# Patient Record
Sex: FEMALE | Race: WHITE | NOT HISPANIC OR LATINO | Employment: STUDENT | URBAN - METROPOLITAN AREA
[De-identification: names, ages, dates, MRNs, and addresses within clinical notes are randomized per-mention and may not be internally consistent; named-entity substitution may affect disease eponyms.]

---
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
## 2022-05-31 ENCOUNTER — OFFICE VISIT (OUTPATIENT)
Dept: OBSTETRICS AND GYNECOLOGY | Facility: CLINIC | Age: 22
End: 2022-05-31

## 2022-05-31 VITALS — SYSTOLIC BLOOD PRESSURE: 106 MMHG | BODY MASS INDEX: 34.9 KG/M2 | WEIGHT: 216.2 LBS | DIASTOLIC BLOOD PRESSURE: 64 MMHG

## 2022-05-31 DIAGNOSIS — Z01.419 ENCOUNTER FOR ANNUAL ROUTINE GYNECOLOGICAL EXAMINATION: Primary | ICD-10-CM

## 2022-05-31 DIAGNOSIS — Z30.41 ENCOUNTER FOR SURVEILLANCE OF CONTRACEPTIVE PILLS: ICD-10-CM

## 2022-05-31 DIAGNOSIS — Z11.3 SCREENING FOR STDS (SEXUALLY TRANSMITTED DISEASES): ICD-10-CM

## 2022-05-31 PROCEDURE — 99395 PREV VISIT EST AGE 18-39: CPT | Performed by: NURSE PRACTITIONER

## 2022-05-31 RX ORDER — NORETHINDRONE ACETATE AND ETHINYL ESTRADIOL, ETHINYL ESTRADIOL AND FERROUS FUMARATE 1MG-10(24)
1 KIT ORAL DAILY
Qty: 84 TABLET | Refills: 3 | Status: SHIPPED | OUTPATIENT
Start: 2022-05-31

## 2022-05-31 RX ORDER — AMITRIPTYLINE HYDROCHLORIDE 10 MG/1
TABLET, FILM COATED ORAL
COMMUNITY
Start: 2022-05-25

## 2022-05-31 NOTE — PROGRESS NOTES
GYN Annual Exam     CC- Here for annual exam.   Subjective   HPI  Winnie Salvaodr is a 21 y.o. female established patient who presents for annual well woman exam. Her periods are typically every three months, secondary to OCP, lasting 7 days and are moderate and clots are present.  She reports mild dysmenorrhea.  Partner Status: Marital Status: single.  New Partners since last visit: yes  Condom usage with IC: frequently.      The patient does not have complaints today.    She exercises regularly: yes.  She has concerns about domestic violence: no.    OB History        0    Para   0    Term   0       0    AB   0    Living   0       SAB   0    IAB   0    Ectopic   0    Molar   0    Multiple   0    Live Births   0              Current contraception: OCP (estrogen/progesterone)  History of abnormal Pap smear: no  Family history of uterine, colon or ovarian cancer: no  Family history of breast cancer: no  H/o STDs: No  Last pap:N/A  Gardasil:completed  Thromboembolic Disease: none    Health Maintenance   Topic Date Due   • ANNUAL PHYSICAL  Never done   • HPV VACCINES (1 - 2-dose series) Never done   • HEPATITIS C SCREENING  Never done   • COVID-19 Vaccine (3 - Booster for Pfizer series) 2021   • CHLAMYDIA SCREENING  2022   • INFLUENZA VACCINE  2022   • TDAP/TD VACCINES (2 - Td or Tdap) 2023   • MENINGOCOCCAL VACCINE  Completed   • Pneumococcal Vaccine 0-64  Aged Out       The following portions of the patient's history were reviewed and updated as appropriate: allergies, current medications, past family history, past medical history, past social history, past surgical history and problem list.    Review of Systems  Review of Systems   Constitutional: Negative.    HENT: Negative.    Eyes: Negative.    Respiratory: Negative.    Cardiovascular: Negative.    Gastrointestinal: Negative.    Endocrine: Negative.    Genitourinary: Negative.    Musculoskeletal: Negative.    Skin: Negative.     Allergic/Immunologic: Negative.    Neurological: Negative.    Hematological: Negative.    Psychiatric/Behavioral: Negative.        I have reviewed and agree with the HPI, ROS, and historical information as entered above. MONICA Horner      Past Medical History:   Diagnosis Date   • Anxiety and depression    • Immunization series complete     Gardasil   • Menorrhagia         Past Surgical History:   Procedure Laterality Date   • SHOULDER SURGERY      Labrum Repair           Objective   /64   Wt 98.1 kg (216 lb 3.2 oz)   LMP 05/26/2022 (Exact Date)   Breastfeeding No   BMI 34.90 kg/m²   Physical Exam  Vitals and nursing note reviewed. Exam conducted with a chaperone present.   Constitutional:       Appearance: Normal appearance.   HENT:      Head: Normocephalic and atraumatic.   Cardiovascular:      Rate and Rhythm: Normal rate and regular rhythm.   Pulmonary:      Effort: Pulmonary effort is normal.      Breath sounds: Normal breath sounds.   Abdominal:      General: Abdomen is flat.      Palpations: Abdomen is soft.   Genitourinary:     Labia:         Right: No rash, tenderness or lesion.         Left: No rash, tenderness or lesion.       Vagina: Normal. No lesions.      Cervix: No cervical motion tenderness, discharge, lesion or cervical bleeding.      Uterus: Normal. Not enlarged, not fixed and not tender.       Adnexa:         Right: No mass or tenderness.          Left: No mass or tenderness.        Rectum: No external hemorrhoid.      Comments: Chaperone Present  Skin:     General: Skin is warm and dry.   Neurological:      Mental Status: She is alert and oriented to person, place, and time.   Psychiatric:         Behavior: Behavior normal.            Assessment    Encounter Diagnoses   Name Primary?   • Screening for STDs (sexually transmitted diseases)    • Encounter for surveillance of contraceptive pills    • Encounter for annual routine gynecological examination Yes       Plan      1. Encouraged use of condoms for STD prevention.   Cultures today as quality measure.   2. Happy on current BERNARD - understands inherent risks including increase lifetime risk of breast cancer dx, VTE and stroke.  Understands BERNARD reduce lifetime risk of ovarian cancer.   3. Gardasil complete.   4. Reviewed exercise as a preventative health measures.   5. RTC in 1 year or PRN with problems       Edith Ramirez, APRN  05/31/2022   none

## 2022-06-01 LAB
C TRACH RRNA SPEC QL NAA+PROBE: NEGATIVE
N GONORRHOEA RRNA SPEC QL NAA+PROBE: NEGATIVE

## 2023-11-27 ENCOUNTER — OFFICE VISIT (OUTPATIENT)
Dept: FAMILY MEDICINE CLINIC | Facility: CLINIC | Age: 23
End: 2023-11-27
Payer: COMMERCIAL

## 2023-11-27 VITALS
HEIGHT: 66 IN | TEMPERATURE: 97.3 F | SYSTOLIC BLOOD PRESSURE: 104 MMHG | WEIGHT: 204.2 LBS | DIASTOLIC BLOOD PRESSURE: 64 MMHG | HEART RATE: 82 BPM | BODY MASS INDEX: 32.82 KG/M2 | OXYGEN SATURATION: 99 %

## 2023-11-27 DIAGNOSIS — J02.9 SORE THROAT: ICD-10-CM

## 2023-11-27 DIAGNOSIS — J01.00 ACUTE NON-RECURRENT MAXILLARY SINUSITIS: Primary | ICD-10-CM

## 2023-11-27 DIAGNOSIS — H10.33 ACUTE BACTERIAL CONJUNCTIVITIS OF BOTH EYES: ICD-10-CM

## 2023-11-27 DIAGNOSIS — J40 BRONCHITIS: ICD-10-CM

## 2023-11-27 DIAGNOSIS — R05.9 COUGH, UNSPECIFIED TYPE: ICD-10-CM

## 2023-11-27 PROBLEM — F41.0 PANIC DISORDER (EPISODIC PAROXYSMAL ANXIETY): Status: ACTIVE | Noted: 2022-02-15

## 2023-11-27 PROBLEM — F33.41 MAJOR DEPRESSIVE DISORDER, RECURRENT, IN PARTIAL REMISSION: Status: ACTIVE | Noted: 2020-04-23

## 2023-11-27 PROBLEM — F90.2 ATTENTION DEFICIT HYPERACTIVITY DISORDER, COMBINED TYPE: Status: ACTIVE | Noted: 2022-11-03

## 2023-11-27 LAB
EXPIRATION DATE: NORMAL
EXPIRATION DATE: NORMAL
INTERNAL CONTROL: NORMAL
INTERNAL CONTROL: NORMAL
Lab: NORMAL
Lab: NORMAL
S PYO AG THROAT QL: NEGATIVE
SARS-COV-2 AG UPPER RESP QL IA.RAPID: NOT DETECTED

## 2023-11-27 PROCEDURE — 87426 SARSCOV CORONAVIRUS AG IA: CPT | Performed by: NURSE PRACTITIONER

## 2023-11-27 PROCEDURE — 87880 STREP A ASSAY W/OPTIC: CPT | Performed by: NURSE PRACTITIONER

## 2023-11-27 PROCEDURE — 99213 OFFICE O/P EST LOW 20 MIN: CPT | Performed by: NURSE PRACTITIONER

## 2023-11-27 RX ORDER — AMOXICILLIN AND CLAVULANATE POTASSIUM 875; 125 MG/1; MG/1
1 TABLET, FILM COATED ORAL 2 TIMES DAILY
Qty: 20 TABLET | Refills: 0 | Status: SHIPPED | OUTPATIENT
Start: 2023-11-27 | End: 2023-12-07

## 2023-11-27 RX ORDER — METHYLPREDNISOLONE 4 MG/1
TABLET ORAL
Qty: 21 EACH | Refills: 0 | Status: SHIPPED | OUTPATIENT
Start: 2023-11-27

## 2023-11-27 RX ORDER — TOBRAMYCIN 3 MG/ML
2 SOLUTION/ DROPS OPHTHALMIC
Qty: 5 ML | Refills: 0 | Status: SHIPPED | OUTPATIENT
Start: 2023-11-27 | End: 2023-12-04

## 2023-11-27 RX ORDER — DEXTROMETHORPHAN HYDROBROMIDE AND PROMETHAZINE HYDROCHLORIDE 15; 6.25 MG/5ML; MG/5ML
5 SYRUP ORAL NIGHTLY PRN
Qty: 118 ML | Refills: 0 | Status: SHIPPED | OUTPATIENT
Start: 2023-11-27

## 2023-11-27 RX ORDER — BENZONATATE 100 MG/1
100 CAPSULE ORAL 3 TIMES DAILY PRN
Qty: 21 CAPSULE | Refills: 0 | Status: SHIPPED | OUTPATIENT
Start: 2023-11-27

## 2023-11-27 RX ORDER — EPINEPHRINE 0.3 MG/.3ML
0.3 INJECTION SUBCUTANEOUS AS NEEDED
COMMUNITY

## 2023-11-27 NOTE — PROGRESS NOTES
Follow Up Office Note     Patient Name: Winnie Salvador  : 2000   MRN: [id number]     Chief Complaint:    Chief Complaint   Patient presents with    Cough     Per patient she has had a cough and congestion for about 2 weeks.    Eye Problem     Per patient her left eye is red, itchy and matted together when she wakes up x 2 days    Sore Throat       History of Present Illness: Winnie Salvador is a 23 y.o. female who presents today with c/o sinus congestion, postnasal drainage, sore throat, cough x approximately 2 weeks.     Patient c/o left eye red, irritated, draining x 2-3 days. Right eye red today.      Subjective      I have reviewed and the following portions of the patient's history were updated as appropriate: past family history, past medical history, past social history, past surgical history and problem list.    Review of Systems:   Review of Systems   Constitutional:  Positive for fatigue. Negative for appetite change, chills, diaphoresis and fever.   HENT:  Positive for congestion, postnasal drip, sinus pressure, sinus pain and sore throat. Negative for ear discharge, ear pain and trouble swallowing.    Eyes:  Positive for discharge and redness. Negative for visual disturbance.   Respiratory:  Positive for cough and chest tightness. Negative for shortness of breath, wheezing and stridor.    Cardiovascular: Negative.    Gastrointestinal: Negative.    Musculoskeletal:  Negative for myalgias.   Neurological:  Negative for dizziness.        Past Medical History:   Past Medical History:   Diagnosis Date    Anxiety and depression     Immunization series complete     Gardasil    Menorrhagia      Patient's last menstrual period was 2023 (exact date).     Medications:     Current Outpatient Medications:     EPINEPHrine (EpiPen 2-Jaun) 0.3 MG/0.3ML solution auto-injector injection, Inject 0.3 mL into the appropriate muscle as directed by prescriber As Needed., Disp: , Rfl:     amoxicillin-clavulanate  "(AUGMENTIN) 875-125 MG per tablet, Take 1 tablet by mouth 2 (Two) Times a Day for 10 days., Disp: 20 tablet, Rfl: 0    benzonatate (Tessalon Perles) 100 MG capsule, Take 1 capsule by mouth 3 (Three) Times a Day As Needed for Cough., Disp: 21 capsule, Rfl: 0    methylPREDNISolone (MEDROL) 4 MG dose pack, Take as directed on package instructions., Disp: 21 each, Rfl: 0    promethazine-dextromethorphan (PROMETHAZINE-DM) 6.25-15 MG/5ML syrup, Take 5 mL by mouth At Night As Needed for Cough., Disp: 118 mL, Rfl: 0    tobramycin (Tobrex) 0.3 % solution ophthalmic solution, Administer 2 drops to both eyes Every 4 (Four) Hours While Awake for 7 days., Disp: 5 mL, Rfl: 0    Allergies:   Allergies   Allergen Reactions    Pineapple Anaphylaxis, Hives, Itching, Shortness Of Breath and Swelling    Milk-Related Compounds Rash         Objective     Physical Exam:  Vital Signs:   Vitals:    11/27/23 0858   BP: 104/64   Pulse: 82   Temp: 97.3 °F (36.3 °C)   TempSrc: Infrared   SpO2: 99%   Weight: 92.6 kg (204 lb 3.2 oz)   Height: 167.6 cm (65.98\")   PainSc: 0-No pain     Body mass index is 32.97 kg/m².     Physical Exam  Vitals and nursing note reviewed.   Constitutional:       General: She is not in acute distress.     Appearance: Normal appearance. She is well-developed. She is not ill-appearing, toxic-appearing or diaphoretic.   HENT:      Head: Normocephalic and atraumatic.      Right Ear: External ear normal. A middle ear effusion is present. Tympanic membrane is injected and bulging.      Left Ear: External ear normal. A middle ear effusion is present. Tympanic membrane is injected and bulging.      Nose: Mucosal edema and congestion present.      Right Turbinates: Swollen.      Left Turbinates: Swollen.      Right Sinus: Maxillary sinus tenderness present.      Left Sinus: Maxillary sinus tenderness present.      Mouth/Throat:      Lips: Pink.      Mouth: Mucous membranes are moist.      Pharynx: Uvula midline. Posterior " oropharyngeal erythema (with cobblestoning) present.   Eyes:      General: Lids are normal.         Right eye: No hordeolum.         Left eye: Discharge present.No hordeolum.      Conjunctiva/sclera:      Right eye: Right conjunctiva is injected.      Left eye: Left conjunctiva is injected.   Cardiovascular:      Rate and Rhythm: Normal rate and regular rhythm.      Heart sounds: Normal heart sounds.   Pulmonary:      Effort: Pulmonary effort is normal. No tachypnea or respiratory distress.      Breath sounds: No stridor. Wheezing (occasional) present. No decreased breath sounds, rhonchi or rales.   Musculoskeletal:      Cervical back: Normal range of motion and neck supple. No rigidity. No muscular tenderness.   Lymphadenopathy:      Cervical: Cervical adenopathy present.   Skin:     General: Skin is warm and dry.   Neurological:      Mental Status: She is alert and oriented to person, place, and time.   Psychiatric:         Mood and Affect: Mood normal.         Behavior: Behavior normal. Behavior is cooperative.         Thought Content: Thought content normal.         Judgment: Judgment normal.         Assessment / Plan      Assessment/Plan:   Diagnoses and all orders for this visit:    1. Acute non-recurrent maxillary sinusitis (Primary)  -     amoxicillin-clavulanate (AUGMENTIN) 875-125 MG per tablet; Take 1 tablet by mouth 2 (Two) Times a Day for 10 days.  Dispense: 20 tablet; Refill: 0    2. Bronchitis  -     methylPREDNISolone (MEDROL) 4 MG dose pack; Take as directed on package instructions.  Dispense: 21 each; Refill: 0    3. Acute bacterial conjunctivitis of both eyes  -     tobramycin (Tobrex) 0.3 % solution ophthalmic solution; Administer 2 drops to both eyes Every 4 (Four) Hours While Awake for 7 days.  Dispense: 5 mL; Refill: 0    4. Sore throat  -     POC Rapid Strep A  Lab Results   Component Value Date    RAPSCRN Negative 11/27/2023      5. Cough, unspecified type  -     POCT SARS-CoV-2 Antigen  -      promethazine-dextromethorphan (PROMETHAZINE-DM) 6.25-15 MG/5ML syrup; Take 5 mL by mouth At Night As Needed for Cough.  Dispense: 118 mL; Refill: 0  -     benzonatate (Tessalon Perles) 100 MG capsule; Take 1 capsule by mouth 3 (Three) Times a Day As Needed for Cough.  Dispense: 21 capsule; Refill: 0     POCT SARS-CoV-2 Antigen (11/27/2023 09:32)       Follow Up:   PRN and at next scheduled appointment(s) with PCP.    Discussed the nature of the medical condition(s) risks, complications, implications, management, safe and proper use of medications. Encouraged medication compliance, and keeping scheduled follow up appointments with me and any other providers.      RTC if symptoms fail to improve, to ER if symptoms worsen.        *Dictated Utilizing Dragon Dictation   Please note that portions of this note were completed with a voice recognition program.   Part of this note may be an electronic transcription/translation of spoken language to printed text using the Dragon Dictation System. Spelling and/or grammatical errors may exist despite efforts at proofreading.      NOTE TO PATIENT: The 21st Century Cures Act makes medical notes like these available to patients in the interest of transparency. However, be advised this is a medical document. It is intended as peer to peer communication. It is written in medical language and may contain abbreviations or verbiage that are unfamiliar. It may appear blunt or direct. Medical documents are intended to carry relevant information, facts as evident, and the clinical opinion of the practitioner.      MONICA Aaron  [facility] [place] [facility] [place]

## 2023-11-27 NOTE — LETTER
November 27, 2023     Patient: Winnie Salvador   YOB: 2000   Date of Visit: 11/27/2023       To Whom It May Concern:    It is my medical opinion that Winnie Salvador  be excused from work 11/27/23-11/28/23 due to illness .           Sincerely,        MONICA Aaron    CC: No Recipients

## 2024-08-23 ENCOUNTER — OFFICE VISIT (OUTPATIENT)
Dept: FAMILY MEDICINE CLINIC | Facility: CLINIC | Age: 24
End: 2024-08-23
Payer: COMMERCIAL

## 2024-08-23 VITALS
BODY MASS INDEX: 35.25 KG/M2 | HEART RATE: 82 BPM | WEIGHT: 211.6 LBS | DIASTOLIC BLOOD PRESSURE: 76 MMHG | TEMPERATURE: 98.6 F | OXYGEN SATURATION: 98 % | HEIGHT: 65 IN | SYSTOLIC BLOOD PRESSURE: 116 MMHG

## 2024-08-23 DIAGNOSIS — J02.9 SORE THROAT: Primary | ICD-10-CM

## 2024-08-23 DIAGNOSIS — J06.9 UPPER RESPIRATORY TRACT INFECTION, UNSPECIFIED TYPE: ICD-10-CM

## 2024-08-23 LAB
EXPIRATION DATE: NORMAL
FLUAV AG NPH QL: NEGATIVE
FLUBV AG NPH QL: NEGATIVE
INTERNAL CONTROL: NORMAL
Lab: NORMAL
S PYO AG THROAT QL: NEGATIVE
SARS-COV-2 AG UPPER RESP QL IA.RAPID: NOT DETECTED

## 2024-08-23 RX ORDER — CETIRIZINE HYDROCHLORIDE 10 MG/1
10 TABLET ORAL DAILY
Qty: 30 TABLET | Refills: 5 | Status: SHIPPED | OUTPATIENT
Start: 2024-08-23

## 2024-08-23 RX ORDER — BROMPHENIRAMINE MALEATE, PSEUDOEPHEDRINE HYDROCHLORIDE, AND DEXTROMETHORPHAN HYDROBROMIDE 2; 30; 10 MG/5ML; MG/5ML; MG/5ML
5 SYRUP ORAL 4 TIMES DAILY PRN
Qty: 118 ML | Refills: 0 | Status: SHIPPED | OUTPATIENT
Start: 2024-08-23

## 2024-08-23 NOTE — PROGRESS NOTES
"Chief Complaint  Establish Care, Sore Throat, and Nasal Congestion    Subjective        Winnie [place] presents to [facility]  URI   This is a new problem. The current episode started in the past 7 days. The problem has been gradually worsening. There has been no fever. Associated symptoms include congestion, ear pain, headaches, rhinorrhea and a sore throat. Pertinent negatives include no diarrhea, dysuria or sinus pain. She has tried nothing for the symptoms. The treatment provided no relief.       Objective   Vital Signs:  /76   Pulse 82   Temp 98.6 °F (37 °C)   Ht 165.1 cm (65\")   Wt 96 kg (211 lb 9.6 oz)   SpO2 98%   BMI 35.21 kg/m²   Estimated body mass index is 35.21 kg/m² as calculated from the following:    Height as of this encounter: 165.1 cm (65\").    Weight as of this encounter: 96 kg (211 lb 9.6 oz).        Physical Exam  Vitals reviewed.   Constitutional:       Appearance: She is not ill-appearing.   HENT:      Right Ear: Tympanic membrane normal.      Left Ear: Tympanic membrane normal.      Nose: Congestion and rhinorrhea present.      Mouth/Throat:      Pharynx: Posterior oropharyngeal erythema present.   Eyes:      Pupils: Pupils are equal, round, and reactive to light.   Cardiovascular:      Rate and Rhythm: Normal rate.      Pulses: Normal pulses.   Pulmonary:      Effort: Pulmonary effort is normal.      Breath sounds: Normal breath sounds.   Neurological:      General: No focal deficit present.      Mental Status: She is alert. Mental status is at baseline.   Psychiatric:         Mood and Affect: Mood normal.         Behavior: Behavior normal.         Thought Content: Thought content normal.         Judgment: Judgment normal.        Result Review :        This SmartLink has not been configured with any valid records.      Lab Results   Component Value Date    RAPSCRN Negative 08/23/2024     Lab Results   Component Value Date    RAPFLUA Negative " 08/23/2024       SARS-CoV-2, JACLYN   Date Value Ref Range Status   11/02/2020 Not Detected Not Detected Final     Comment:     This nucleic acid amplification test was developed and its performance  characteristics determined by [email or web address]. Nucleic acid  amplification tests include PCR and TMA. This test has not been FDA  cleared or approved. This test has been authorized by FDA under an  Emergency Use Authorization (EUA). This test is only authorized for  the duration of time the declaration that circumstances exist  justifying the authorization of the emergency use of in vitro  diagnostic tests for detection of SARS-CoV-2 virus and/or diagnosis  of COVID-19 infection under section 564(b)(1) of the Act, 21 U.S.C.  360bbb-3(b) (1), unless the authorization is terminated or revoked  sooner.  When diagnostic testing is negative, the possibility of a false  negative result should be considered in the context of a patient's  recent exposures and the presence of clinical signs and symptoms  consistent with COVID-19. An individual without symptoms of COVID-19  and who is not shedding SARS-CoV-2 virus would expect to have a  negative (not detected) result in this assay.              Assessment and Plan   Diagnoses and all orders for this visit:    1. Sore throat (Primary)  -     POCT rapid strep A  -     POC Influenza A / B  -     POCT SARS-CoV-2 Antigen    2. Upper respiratory tract infection, unspecified type  Assessment & Plan:  Likely initially a viral URI with no evidence of secondary bacterial infection  Discussed use of saline nasal rinses and medications as prescribed  Continue allergy medications, Tylenol/ibuprofen as needed.  If symptoms do not improve patient to return to clinic for reevaluation      Orders:  -     brompheniramine-pseudoephedrine-DM 30-2-10 MG/5ML syrup; Take 5 mL by mouth 4 (Four) Times a Day As Needed for Cough.  Dispense: 118 mL; Refill: 0  -     cetirizine (zyrTEC) 10 MG tablet; Take  1 tablet by mouth Daily.  Dispense: 30 tablet; Refill: 5             Follow Up   No follow-ups on file.  Patient was given instructions and counseling regarding her condition or for health maintenance advice. Please see specific information pulled into the AVS if appropriate.         This document has been electronically signed by Amelia Joaquin DO   August 23, 2024 10:46 EDT    Dictated Utilizing Dragon Dictation: Part of this note may be an electronic transcription/translation of spoken language to printed text using the Dragon Dictation System.    Amelia Joaquin D.O.  [facility] [place]

## 2024-08-23 NOTE — ASSESSMENT & PLAN NOTE
Likely initially a viral URI with no evidence of secondary bacterial infection  Discussed use of saline nasal rinses and medications as prescribed  Continue allergy medications, Tylenol/ibuprofen as needed.  If symptoms do not improve patient to return to clinic for reevaluation

## 2025-05-06 ENCOUNTER — OFFICE VISIT (OUTPATIENT)
Dept: FAMILY MEDICINE CLINIC | Facility: CLINIC | Age: 25
End: 2025-05-06
Payer: COMMERCIAL

## 2025-05-06 VITALS
DIASTOLIC BLOOD PRESSURE: 78 MMHG | RESPIRATION RATE: 20 BRPM | BODY MASS INDEX: 31.96 KG/M2 | TEMPERATURE: 98.2 F | SYSTOLIC BLOOD PRESSURE: 128 MMHG | OXYGEN SATURATION: 99 % | HEIGHT: 65 IN | WEIGHT: 191.8 LBS | HEART RATE: 100 BPM

## 2025-05-06 DIAGNOSIS — R05.9 COUGH, UNSPECIFIED TYPE: ICD-10-CM

## 2025-05-06 DIAGNOSIS — J02.9 SORE THROAT: ICD-10-CM

## 2025-05-06 DIAGNOSIS — H66.001 NON-RECURRENT ACUTE SUPPURATIVE OTITIS MEDIA OF RIGHT EAR WITHOUT SPONTANEOUS RUPTURE OF TYMPANIC MEMBRANE: ICD-10-CM

## 2025-05-06 DIAGNOSIS — J10.1 INFLUENZA A: ICD-10-CM

## 2025-05-06 DIAGNOSIS — U07.1 COVID-19 VIRUS DETECTED: Primary | ICD-10-CM

## 2025-05-06 LAB
EXPIRATION DATE: ABNORMAL
EXPIRATION DATE: ABNORMAL
EXPIRATION DATE: NORMAL
FLUAV AG NPH QL: POSITIVE
FLUBV AG NPH QL: NEGATIVE
INTERNAL CONTROL: ABNORMAL
INTERNAL CONTROL: ABNORMAL
INTERNAL CONTROL: NORMAL
Lab: ABNORMAL
Lab: ABNORMAL
Lab: NORMAL
S PYO AG THROAT QL: NEGATIVE
SARS-COV-2 AG UPPER RESP QL IA.RAPID: DETECTED

## 2025-05-06 RX ORDER — BUPROPION HYDROCHLORIDE 100 MG/1
300 TABLET, EXTENDED RELEASE ORAL 2 TIMES DAILY
COMMUNITY

## 2025-05-06 RX ORDER — BROMPHENIRAMINE MALEATE, PSEUDOEPHEDRINE HYDROCHLORIDE, AND DEXTROMETHORPHAN HYDROBROMIDE 2; 30; 10 MG/5ML; MG/5ML; MG/5ML
5 SYRUP ORAL 4 TIMES DAILY PRN
Qty: 118 ML | Refills: 0 | Status: SHIPPED | OUTPATIENT
Start: 2025-05-06

## 2025-05-06 RX ORDER — GUAIFENESIN 600 MG/1
1200 TABLET, EXTENDED RELEASE ORAL 2 TIMES DAILY
Qty: 30 TABLET | Refills: 0 | Status: SHIPPED | OUTPATIENT
Start: 2025-05-06

## 2025-05-06 RX ORDER — METHYLPHENIDATE HYDROCHLORIDE 36 MG/1
36 TABLET ORAL EVERY MORNING
COMMUNITY
Start: 2025-04-29

## 2025-05-06 NOTE — LETTER
May 6, 2025     Patient: Winnie Salvador   YOB: 2000   Date of Visit: 5/6/2025       To Whom It May Concern:    It is my medical opinion that Winnie Salvador may return to work on Friday 5/9/25           Sincerely,        Amelia Joaquin DO    CC: No Recipients

## 2025-05-06 NOTE — PROGRESS NOTES
Subjective     Chief Complaint  Fever, Cough, Nasal Congestion, Fatigue, Sore Throat (Since Saturday), and Generalized Body Aches    Subjective          Winnie Salvador is a 24 y.o. female who presents today to [facility] for initial evaluation .    HPI:   Fever   Associated symptoms include coughing, nausea and a sore throat.   Cough  Associated symptoms: fever and sore throat    Fatigue  Symptoms include cough, fatigue, a fever, nausea and sore throat.    Sore Throat  Associated symptoms: cough        History of Present Illness  The patient is a 24-year-old female who presents today with a cough.    She reports that her symptoms began on Saturday with a headache, which was followed by nasal congestion, sore throat, and persistent headache on Sunday. Her condition deteriorated on Monday, characterized by a fever of 102.8 degrees, nausea, vomiting, and generalized body aches. She also describes a sensation of her head being filled with cotton. Yesterday, she experienced a fever of 102.8 degrees and vomiting. She has been managing her fever with acetaminophen, which she believes has been effective. She has never had COVID-19 before but tested positive for both flu A and COVID-19.     She has no known allergies to antibiotics but notes that she typically develops yeast infections when on antibiotic therapy. She has previously found Bromfed beneficial for her cough.      The following portions of the patient's history were reviewed and updated as appropriate: allergies, current medications, past family history, past medical history, past social history, past surgical history and problem list.    Objective     Objective     Allergy:   Allergies   Allergen Reactions    Pineapple Anaphylaxis, Hives, Itching, Shortness Of Breath and Swelling    Milk-Related Compounds Rash        Current Medications:   Current Outpatient Medications   Medication Sig Dispense Refill     brompheniramine-pseudoephedrine-DM 30-2-10 MG/5ML syrup Take 5 mL by mouth 4 (Four) Times a Day As Needed for Cough. 118 mL 0    buPROPion SR (WELLBUTRIN SR) 100 MG 12 hr tablet Take 3 tablets by mouth 2 (Two) Times a Day.      cetirizine (zyrTEC) 10 MG tablet Take 1 tablet by mouth Daily. 30 tablet 5    EPINEPHrine (EpiPen 2-Jaun) 0.3 MG/0.3ML solution auto-injector injection Inject 0.3 mL into the appropriate muscle as directed by prescriber As Needed.      methylphenidate 36 MG CR tablet Take 1 tablet by mouth Every Morning      amoxicillin-clavulanate (AUGMENTIN) 875-125 MG per tablet Take 1 tablet by mouth 2 (Two) Times a Day. 14 tablet 0    guaiFENesin (Mucinex) 600 MG 12 hr tablet Take 2 tablets by mouth 2 (Two) Times a Day. 30 tablet 0     No current facility-administered medications for this visit.       Past Medical History:  Past Medical History:   Diagnosis Date    Anxiety and depression     Immunization series complete     Gardasil    Menorrhagia        Past Surgical History:  Past Surgical History:   Procedure Laterality Date    SHOULDER SURGERY      Labrum Repair       Social History:  Social History     Socioeconomic History    Marital status: Single   Tobacco Use    Smoking status: Former     Current packs/day: 0.00     Types: Cigarettes     Quit date: 2024     Years since quittin.5     Passive exposure: Past    Smokeless tobacco: Never    Tobacco comments:     Vaping   Vaping Use    Vaping status: Former    Quit date: 2024    Passive vaping exposure: Yes   Substance and Sexual Activity    Alcohol use: Yes     Comment: Occasional     Drug use: Never    Sexual activity: Yes     Birth control/protection: OCP, Condom       Family History:  Family History   Problem Relation Age of Onset    Diabetes Mother     Diabetes Paternal Grandmother     Diabetes Maternal Grandmother     Hypertension Maternal Grandmother     Hypertension Maternal Grandfather        Review of Systems:  Review of Systems  "  Constitutional:  Positive for fatigue and fever.   HENT:  Positive for sore throat.    Respiratory:  Positive for cough.    Gastrointestinal:  Positive for nausea.       Vital Signs:   /78 (BP Location: Right arm, Patient Position: Sitting, Cuff Size: Adult)   Pulse 100   Temp 98.2 °F (36.8 °C) (Temporal)   Resp 20   Ht 165 cm (64.96\")   Wt 87 kg (191 lb 12.8 oz)   SpO2 99%   BMI 31.96 kg/m²      Physical Exam:  Physical Exam  Vitals reviewed.   Constitutional:       Appearance: She is not ill-appearing.   HENT:      Right Ear: Tympanic membrane normal. A middle ear effusion is present. Tympanic membrane is erythematous.      Left Ear: Tympanic membrane normal. A middle ear effusion is present.      Nose: Congestion and rhinorrhea present.   Eyes:      Pupils: Pupils are equal, round, and reactive to light.   Cardiovascular:      Rate and Rhythm: Normal rate.      Pulses: Normal pulses.   Pulmonary:      Effort: Pulmonary effort is normal.      Breath sounds: Normal breath sounds.   Neurological:      General: No focal deficit present.      Mental Status: She is alert. Mental status is at baseline.               PHQ-9 Score  PHQ-9 Total Score:      Lab Review  Office Visit on 05/06/2025   Component Date Value Ref Range Status    Rapid Influenza A Ag 05/06/2025 Positive (A)  Negative Final    Rapid Influenza B Ag 05/06/2025 Negative  Negative Final    Internal Control 05/06/2025 Passed  Passed Final    Lot Number 05/06/2025 [id number]   Final    Expiration Date 05/06/2025 10/17/2026   Final    Rapid Strep A Screen 05/06/2025 Negative  Negative, VALID, INVALID, Not Performed Final    Internal Control 05/06/2025 Passed  Passed Final    Lot Number 05/06/2025 [id number]   Final    Expiration Date 05/06/2025 03/08/2027   Final    SARS Antigen 05/06/2025 Detected (A)  Not Detected, Presumptive Negative Final    Internal Control 05/06/2025 Passed  Passed Final    Lot Number 05/06/2025 [id number]   Final    " Expiration Date 05/06/2025 06/23/2025   Final        Radiology Results        Assessment / Plan         Assessment and Plan   Diagnoses and all orders for this visit:    1. COVID-19 virus detected (Primary)  -     brompheniramine-pseudoephedrine-DM 30-2-10 MG/5ML syrup; Take 5 mL by mouth 4 (Four) Times a Day As Needed for Cough.  Dispense: 118 mL; Refill: 0    2. Cough, unspecified type  -     POC Influenza A / B  -     POCT SARS-CoV-2 Antigen  -     guaiFENesin (Mucinex) 600 MG 12 hr tablet; Take 2 tablets by mouth 2 (Two) Times a Day.  Dispense: 30 tablet; Refill: 0    3. Sore throat  -     POC Rapid Strep A    4. Influenza A  -     brompheniramine-pseudoephedrine-DM 30-2-10 MG/5ML syrup; Take 5 mL by mouth 4 (Four) Times a Day As Needed for Cough.  Dispense: 118 mL; Refill: 0    5. Non-recurrent acute suppurative otitis media of right ear without spontaneous rupture of tympanic membrane  -     amoxicillin-clavulanate (AUGMENTIN) 875-125 MG per tablet; Take 1 tablet by mouth 2 (Two) Times a Day.  Dispense: 14 tablet; Refill: 0        Assessment & Plan  1. Influenza A.  - Symptoms include fever (102.8°F), body aches, and nausea.  - Physical examination reveals no wheezing and normal heart sounds.  - Advised to take Tylenol and ibuprofen alternately every 3 to 4 hours to manage fever and body aches. Mucinex recommended for congestion and cough relief.  - Prescription for Bromfed will be sent to pharmacy to help with the cough. If symptoms worsen, patient should notify the clinic.    2. COVID-19.  - Symptoms include fever, body aches, nausea, and vomiting.  - Given mild symptoms and existing gastrointestinal upset, Paxlovid will not be prescribed at this time.  - Advised to monitor symptoms and report any worsening.    3. Ear infection.  - Examination reveals fluid behind the ear with a yellow tint.  - Prescription for antibiotics will be sent to pharmacy.  - Patient should contact the clinic via [employer] if  experiencing any issues with the antibiotics, such as yeast infections.      Discussed possible differential diagnoses, testing, treatment, recommended non-pharmacological interventions, risks, warning signs to monitor for that would indicate need for follow-up in clinic or ER. If no improvement with these regimens or you have new or worsening symptoms follow-up. Patient verbalizes understanding and agreement with plan of care. Denies further needs or concerns.     Patient was given instructions and counseling regarding her condition and for health maintenance advised.            Health Maintenance  Health Maintenance:   Health Maintenance Due   Topic Date Due    ANNUAL PHYSICAL  Never done    CHLAMYDIA SCREENING  05/31/2023    PAP SMEAR  Never done    TDAP/TD VACCINES (2 - Td or Tdap) 08/06/2023    MENINGOCOCCAL B VACCINE (2 of 2 - Bexsero SCDM 2-dose series) 10/25/2023        Meds ordered during this visit  New Medications Ordered This Visit   Medications    brompheniramine-pseudoephedrine-DM 30-2-10 MG/5ML syrup     Sig: Take 5 mL by mouth 4 (Four) Times a Day As Needed for Cough.     Dispense:  118 mL     Refill:  0    guaiFENesin (Mucinex) 600 MG 12 hr tablet     Sig: Take 2 tablets by mouth 2 (Two) Times a Day.     Dispense:  30 tablet     Refill:  0    amoxicillin-clavulanate (AUGMENTIN) 875-125 MG per tablet     Sig: Take 1 tablet by mouth 2 (Two) Times a Day.     Dispense:  14 tablet     Refill:  0       Meds stopped during this visit:  Medications Discontinued During This Encounter   Medication Reason    brompheniramine-pseudoephedrine-DM 30-2-10 MG/5ML syrup Reorder        Visit Diagnoses    ICD-10-CM ICD-9-CM   1. COVID-19 virus detected  U07.1 079.89   2. Cough, unspecified type  R05.9 786.2   3. Sore throat  J02.9 462   4. Influenza A  J10.1 487.1   5. Non-recurrent acute suppurative otitis media of right ear without spontaneous rupture of tympanic membrane  H66.001 382.00       Patient was given  instructions and counseling regarding her condition or for health maintenance advice. Please see specific information pulled into the AVS if appropriate.     Follow Up   Return if symptoms worsen or fail to improve.      Patient or patient representative verbalized consent for the use of Ambient Listening during the visit with  Amelia Joaquin DO for chart documentation. 5/6/2025  10:36 EDT      This document has been electronically signed by Amelia Joaquin DO   May 6, 2025 11:24 EDT    Dictated Utilizing Dragon Dictation: Part of this note may be an electronic transcription/translation of spoken language to printed text using the Dragon Dictation System.    Amelia Joaquin D.O.  [facility] [place] [facility] [place]

## 2025-06-20 ENCOUNTER — OFFICE VISIT (OUTPATIENT)
Dept: FAMILY MEDICINE CLINIC | Facility: CLINIC | Age: 25
End: 2025-06-20
Payer: COMMERCIAL

## 2025-06-20 VITALS
SYSTOLIC BLOOD PRESSURE: 120 MMHG | WEIGHT: 192.8 LBS | BODY MASS INDEX: 32.12 KG/M2 | TEMPERATURE: 98.9 F | OXYGEN SATURATION: 97 % | DIASTOLIC BLOOD PRESSURE: 78 MMHG | HEIGHT: 65 IN | HEART RATE: 94 BPM | RESPIRATION RATE: 20 BRPM

## 2025-06-20 DIAGNOSIS — R07.89 MUSCULOSKELETAL CHEST PAIN: Primary | ICD-10-CM

## 2025-06-20 PROCEDURE — 99213 OFFICE O/P EST LOW 20 MIN: CPT | Performed by: FAMILY MEDICINE

## 2025-06-20 RX ORDER — METHYLPHENIDATE HYDROCHLORIDE 54 MG/1
54 TABLET ORAL EVERY MORNING
COMMUNITY
Start: 2025-05-13

## 2025-06-20 RX ORDER — PREDNISONE 10 MG/1
TABLET ORAL
Qty: 18 TABLET | Refills: 0 | Status: SHIPPED | OUTPATIENT
Start: 2025-06-20

## 2025-06-20 RX ORDER — IBUPROFEN 600 MG/1
600 TABLET, FILM COATED ORAL EVERY 8 HOURS PRN
Qty: 30 TABLET | Refills: 0 | Status: SHIPPED | OUTPATIENT
Start: 2025-06-20

## 2025-06-20 NOTE — PROGRESS NOTES
"    Follow Up Office Visit      Date: 2025   Patient Name: Winnie Salvador  : 2000   MRN: [id number]     Chief Complaint:    Chief Complaint   Patient presents with    Chest Pain     Pain the hurts when moving.she believes it muscle skeleton pain. 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       History of Present Illness: Winnie Salvador is a 25 y.o. female who presents today for evaluation of sharp chest pain.  Reports it hurts when she is moving and she believes it is musculoskeletal pain.  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    Onset was two days ago.  Was more dull.  Reports she was \"nannying\" when it started.    Reports sometimes she can pop her sternum- Reports this started about 2 days ago.    Woke up this morning the area was hurting really bad.  When she sat up it hurt really bad.  Also reports her metal water bottle on the ground and she reached over with her right arm picking up the bottle and had really bad chest pain with lifting the bottle.    No exertional chest pain.    Sees Dr. Joaquin for pcp.    Last seen on 25 per Dr. Joaquin.  Was seen for cough, fever and sore throat.  DX with Covid.          Subjective      Review of Systems:   Review of Systems   Constitutional:  Negative for chills and fever.   Respiratory:  Negative for shortness of breath.    Cardiovascular:  Positive for chest pain.   Gastrointestinal:  Negative for nausea and vomiting.       I have reviewed the patients family history, social history, past medical history, past surgical history and have updated it as appropriate.     Medications:     Current Outpatient Medications:     buPROPion SR (WELLBUTRIN SR) 100 MG 12 hr tablet, Take 3 tablets by mouth 2 (Two) Times a Day., Disp: , Rfl:     EPINEPHrine (EpiPen 2-Jaun) 0.3 MG/0.3ML solution auto-injector injection, Inject 0.3 mL into the appropriate muscle as directed by prescriber As Needed., Disp: , Rfl:     methylphenidate 54 MG CR tablet, Take 1 tablet " "by mouth Every Morning, Disp: , Rfl:     amoxicillin-clavulanate (AUGMENTIN) 875-125 MG per tablet, Take 1 tablet by mouth 2 (Two) Times a Day. (Patient not taking: Reported on 6/20/2025), Disp: 14 tablet, Rfl: 0    brompheniramine-pseudoephedrine-DM 30-2-10 MG/5ML syrup, Take 5 mL by mouth 4 (Four) Times a Day As Needed for Cough. (Patient not taking: Reported on 6/20/2025), Disp: 118 mL, Rfl: 0    cetirizine (zyrTEC) 10 MG tablet, Take 1 tablet by mouth Daily. (Patient not taking: Reported on 6/20/2025), Disp: 30 tablet, Rfl: 5    guaiFENesin (Mucinex) 600 MG 12 hr tablet, Take 2 tablets by mouth 2 (Two) Times a Day. (Patient not taking: Reported on 6/20/2025), Disp: 30 tablet, Rfl: 0    ibuprofen (ADVIL,MOTRIN) 600 MG tablet, Take 1 tablet by mouth Every 8 (Eight) Hours As Needed for Mild Pain or Moderate Pain., Disp: 30 tablet, Rfl: 0    predniSONE (DELTASONE) 10 MG tablet, Take three tablets daily for three days, then two tablets daily for three days, then one tablet daily for three days., Disp: 18 tablet, Rfl: 0    Allergies:   Allergies   Allergen Reactions    Pineapple Anaphylaxis, Hives, Itching, Shortness Of Breath and Swelling    Milk-Related Compounds Rash       Objective     Physical Exam: Please see above  Vital Signs:   Vitals:    06/20/25 1118   BP: 120/78   BP Location: Right arm   Patient Position: Sitting   Cuff Size: Adult   Pulse: 94   Resp: 20   Temp: 98.9 °F (37.2 °C)   TempSrc: Temporal   SpO2: 97%   Weight: 87.5 kg (192 lb 12.8 oz)   Height: 165 cm (64.96\")   PainSc: 2    PainLoc: Chest     Body mass index is 32.12 kg/m².          Physical Exam  Vitals and nursing note reviewed.   Constitutional:       Appearance: Normal appearance.   HENT:      Head: Normocephalic and atraumatic.   Neck:      Vascular: No carotid bruit.   Cardiovascular:      Rate and Rhythm: Normal rate and regular rhythm.      Heart sounds: Normal heart sounds. No murmur heard.  Pulmonary:      Effort: Pulmonary effort is " "normal.      Breath sounds: Normal breath sounds.   Abdominal:      General: Bowel sounds are normal.      Palpations: Abdomen is soft. There is no mass.      Tenderness: There is no abdominal tenderness.   Musculoskeletal:        Arms:       Right lower leg: No edema.      Left lower leg: No edema.   Skin:     Coloration: Skin is not jaundiced or pale.      Findings: No erythema.   Neurological:      Mental Status: She is alert. Mental status is at baseline.   Psychiatric:         Mood and Affect: Mood normal.         Behavior: Behavior normal.         Procedures    Results:   Labs:   No results found for: \"HGBA1C\", \"CMP\", \"CBCDIFFPANEL\", \"CREAT\", \"TSH\"     POCT Results (if applicable):   Results for orders placed or performed in visit on 05/06/25   POC Influenza A / B    Collection Time: 05/06/25 10:32 AM    Specimen: Swab   Result Value Ref Range    Rapid Influenza A Ag Positive (A) Negative    Rapid Influenza B Ag Negative Negative    Internal Control Passed Passed    Lot Number [id number]     Expiration Date 10/17/2026    POC Rapid Strep A    Collection Time: 05/06/25 10:32 AM    Specimen: Swab   Result Value Ref Range    Rapid Strep A Screen Negative Negative, VALID, INVALID, Not Performed    Internal Control Passed Passed    Lot Number [id number]     Expiration Date 03/08/2027    POCT SARS-CoV-2 Antigen    Collection Time: 05/06/25 10:33 AM    Specimen: Nasopharynx; Swab   Result Value Ref Range    SARS Antigen Detected (A) Not Detected, Presumptive Negative    Internal Control Passed Passed    Lot Number [id number]     Expiration Date 06/23/2025        PHQ-9: PHQ-9 Total Score:       Imaging:   No valid procedures specified.         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  Has sternal pain with certain movements.  Non-exertional pain.  I feel patient is having musculoskeletal pain.    Discussed if she develops exertional " chest pain that may be associated with shortness of breath, go to ER for evaluation.  Patient expressed understanding an d agreed to do so.      1. Musculoskeletal chest pain  Will get XR sternum.  I will prescribe steroid taper.  Ibuprofen course.  Encouraged no heavy lifting, or pushing or pulling.    - XR sternum pa and lateral; Future  - predniSONE (DELTASONE) 10 MG tablet; Take three tablets daily for three days, then two tablets daily for three days, then one tablet daily for three days.  Dispense: 18 tablet; Refill: 0  - ibuprofen (ADVIL,MOTRIN) 600 MG tablet; Take 1 tablet by mouth Every 8 (Eight) Hours As Needed for Mild Pain or Moderate Pain.  Dispense: 30 tablet; Refill: 0      Vaccine Counseling:      Follow Up:   Return if symptoms worsen or fail to improve, or as scheduled with pcp..      DO TOBI Maldonado

## 2025-06-20 NOTE — PATIENT INSTRUCTIONS
Prednisone taper.  Ibuprofen as ordered as needed.  XR sternum today.  Try to avoid heavy lifting for 14 days